# Patient Record
Sex: MALE | Race: BLACK OR AFRICAN AMERICAN | NOT HISPANIC OR LATINO | URBAN - METROPOLITAN AREA
[De-identification: names, ages, dates, MRNs, and addresses within clinical notes are randomized per-mention and may not be internally consistent; named-entity substitution may affect disease eponyms.]

---

## 2020-09-03 ENCOUNTER — EMERGENCY (EMERGENCY)
Facility: HOSPITAL | Age: 65
LOS: 1 days | Discharge: HOME | End: 2020-09-03
Attending: STUDENT IN AN ORGANIZED HEALTH CARE EDUCATION/TRAINING PROGRAM | Admitting: STUDENT IN AN ORGANIZED HEALTH CARE EDUCATION/TRAINING PROGRAM
Payer: MEDICAID

## 2020-09-03 VITALS
SYSTOLIC BLOOD PRESSURE: 138 MMHG | OXYGEN SATURATION: 100 % | DIASTOLIC BLOOD PRESSURE: 69 MMHG | TEMPERATURE: 98 F | HEART RATE: 70 BPM | RESPIRATION RATE: 16 BRPM

## 2020-09-03 VITALS
TEMPERATURE: 98 F | HEART RATE: 66 BPM | SYSTOLIC BLOOD PRESSURE: 114 MMHG | DIASTOLIC BLOOD PRESSURE: 64 MMHG | RESPIRATION RATE: 18 BRPM | OXYGEN SATURATION: 100 %

## 2020-09-03 PROCEDURE — 70450 CT HEAD/BRAIN W/O DYE: CPT | Mod: 26

## 2020-09-03 PROCEDURE — 72125 CT NECK SPINE W/O DYE: CPT | Mod: 26

## 2020-09-03 PROCEDURE — 99284 EMERGENCY DEPT VISIT MOD MDM: CPT

## 2020-09-03 RX ORDER — ACETAMINOPHEN 500 MG
975 TABLET ORAL ONCE
Refills: 0 | Status: COMPLETED | OUTPATIENT
Start: 2020-09-03 | End: 2020-09-03

## 2020-09-03 RX ADMIN — Medication 975 MILLIGRAM(S): at 16:14

## 2020-09-03 RX ADMIN — Medication 975 MILLIGRAM(S): at 15:28

## 2020-09-03 NOTE — ED PROVIDER NOTE - ATTENDING CONTRIBUTION TO CARE
65M sent from [facility] s/p fall. Pt states he was given a medicine for his back pain, then got dizzy and past out after standing up to go to lunch. Fell backwards, hit head on floor. Unknown down time but denies LOC. States that he had never gotten this medication before. +HA and neck pain. No longer feeling dizzy. Denies cp, sob, back pain, extremity pain, weakness, numbness/tingling. Full memory of event. No blood thinners  Gen: nad  HEENT: head without evidence of trauma, no ecchymosis appreciated, PERRL, EOMI  CV: rrr, no murmur  Pulm: clear lungs  Abd: soft, nt, n  MSK: atraumatic, FROM all extremities; lateral tenderness to neck without midline tenderness, no spinal tenderness  Neuro: CN 2-12 grossly intact, motor 5/5 all extremities, sensory grossly intact  MDM: 65M sent from [facility] s/p fall, likely med induced syncopal episode with fall backwayrds - will get CTH and neck to assess for injury, if negative can send back to [facility]

## 2020-09-03 NOTE — ED PROVIDER NOTE - CLINICAL SUMMARY MEDICAL DECISION MAKING FREE TEXT BOX
65M presenting for fall after Tizanidine. PE: VSS, small hematoma on scalp DdX: Likely medication side effect, low concern for subdural/epidural given mechanism, but given age will eval. Plan: CT head and neck.

## 2020-09-03 NOTE — ED PROVIDER NOTE - PATIENT PORTAL LINK FT
You can access the FollowMyHealth Patient Portal offered by [facility] by registering at the following website: [email or web address]. By joining [email or web address]’s FollowMyHealth portal, you will also be able to view your health information using other applications (apps) compatible with our system.

## 2020-09-03 NOTE — ED PROVIDER NOTE - PROGRESS NOTE DETAILS
Rusty, PGY2 – Pt was re-evaluated at bedside, feeling better overall, ambulating. CT non-actionable. Results were discussed with patient as well as return precautions and follow up plan with PCP and/or specialist. Time was taken to answer any questions that the patient had before providing them with discharge paperwork. SW arranged transport back to [facility].

## 2020-09-03 NOTE — ED PROVIDER NOTE - NSFOLLOWUPINSTRUCTIONS_ED_ALL_ED_FT
Please take Tylenol for your headaches.     Return to the ER immediately for new or worsening headaches vomiting, or any other concerning symptoms.     See attached information on concussion.     Take your home meds as prescribed, Please take Tylenol for your headaches.     Return to the ER immediately for new or worsening headaches vomiting, or any other concerning symptoms.     See attached information on concussion.     Take your home meds as prescribed.

## 2020-09-03 NOTE — ED PROVIDER NOTE - NS ED ROS FT
CONST: no fevers, no chills, no trauma  EYES: no pain, no blurry vision   ENT: no sore throat, no epistaxis, no rhinorrhea  CV: no chest pain, no palpitations, no orthopnea, no extremity pain or swelling  RESP: no shortness of breath, no cough, no sputum, no pleurisy, no wheezing  ABD: no abdominal pain, no nausea, no vomiting, no diarrhea, no black or bloody stool  : no dysuria, no hematuria, no frequency, no urgency  MSK: no back pain, no neck pain, no extremity pain  NEURO: + headache, no sensory disturbances, no focal weakness, no dizziness  HEME: no easy bleeding or bruising  SKIN: no diaphoresis, no rash

## 2020-09-03 NOTE — ED ADULT NURSE NOTE - OBJECTIVE STATEMENT
break coverage RN - pt received in spot 27, A&OX3. Pt from [facility]. Pt states he was given a medication for back pain, shortly after felt dizzy and fell. States he hit the back of his head on a rail and + abrasion to back of head. Denies LOC. Unwitnessed fall. Pt c/o head pain at this time. Denies continued dizziness, CP. Resp even and unlabored. EKG completed. Medicated as per EMAR. Will continue to monitor.

## 2020-09-03 NOTE — ED ADULT NURSE NOTE - CHIEF COMPLAINT QUOTE
Patient brought to ER by EMS from [place] for c/o dizziness. Pt was given Zenaflex for back pain, became dizzy and fell. He hit the back of his head on bed rail and has a bump to back of head. No LOC. The fall was not witnessed, staff heard him fall and came to assistance.

## 2020-09-03 NOTE — ED PROVIDER NOTE - OBJECTIVE STATEMENT
65M no significant pmhx presenting with CC of fall at [facility]. States that he was having back pain, given Tizanidine, got dizzy and fell backwards, hit the back of her head. Denies LOC, states that he had never gotten this medication before. +HA at this time. No memory loss, lightheadedness, nausea/vomiting at this time. No cardiac hx, no cp.

## 2020-09-03 NOTE — PROVIDER CONTACT NOTE (OTHER) - ASSESSMENT
Pt is from [place] [id number] [phone], spoke with Ying  and confirmed pt is a resident there. I arranged [employer] [phone]. [facility] Auth# . P/U 10 PM. Verbal Huddle completed.

## 2020-09-03 NOTE — ED PROVIDER NOTE - PHYSICAL EXAMINATION
Const: Well-nourished, Well-developed, appearing stated age.  Eyes: no conjunctival injection, and symmetrical lids.  HEENT: +superficial hematoma (3cm?) on posterior right scalp. Atraumatic external nose and ears. Moist MM.  Neck: Symmetric, trachea midline.   CVS: +S1/S2, Peripheral pulses 2+ and equal in all extremities.  RESP: Unlabored respiratory effort. Clear to auscultation bilaterally.  GI: Nontender/Nondistended, No CVA tenderness b/l.   MSK: Normocephalic/Atraumatic, Lower Extremities w/o calf tenderness or edema b/l.   Skin: Warm, dry and intact.   Neuro: CNs II-XII grossly intact. Motor & Sensation grossly intact.  Psych: Awake, Alert, & Oriented (AAO) x3. Appropriate mood and affect.

## 2020-09-03 NOTE — ED ADULT NURSE REASSESSMENT NOTE - NS ED NURSE REASSESS COMMENT FT1
Spoke to ANNA Winston. Pt to take taxi back to [facility]. Spoke to ANNA Winston. Pt to take taxi back to [facility]. Paperwork given.

## 2020-09-03 NOTE — ED ADULT TRIAGE NOTE - CHIEF COMPLAINT QUOTE
Patient brought to ER by EMS from [place] for c/o dizziness. Pt was given Zenaflex for back pain, became dizzy and fell. He hit the back of his head on bed rail and has a bump to back of head. No LOC. The fall was not witnessed, staff heard him fall and came to assistance.

## 2020-09-27 ENCOUNTER — EMERGENCY (EMERGENCY)
Facility: HOSPITAL | Age: 65
LOS: 1 days | Discharge: HOME | End: 2020-09-27
Attending: STUDENT IN AN ORGANIZED HEALTH CARE EDUCATION/TRAINING PROGRAM | Admitting: STUDENT IN AN ORGANIZED HEALTH CARE EDUCATION/TRAINING PROGRAM
Payer: MEDICAID

## 2020-09-27 VITALS
TEMPERATURE: 98 F | OXYGEN SATURATION: 98 % | RESPIRATION RATE: 20 BRPM | DIASTOLIC BLOOD PRESSURE: 76 MMHG | HEART RATE: 73 BPM | SYSTOLIC BLOOD PRESSURE: 133 MMHG

## 2020-09-27 VITALS
RESPIRATION RATE: 20 BRPM | TEMPERATURE: 98 F | DIASTOLIC BLOOD PRESSURE: 50 MMHG | OXYGEN SATURATION: 100 % | HEART RATE: 59 BPM | SYSTOLIC BLOOD PRESSURE: 108 MMHG

## 2020-09-27 LAB — SARS-COV-2 RNA SPEC QL NAA+PROBE: SIGNIFICANT CHANGE UP

## 2020-09-27 PROCEDURE — 99283 EMERGENCY DEPT VISIT LOW MDM: CPT

## 2020-09-27 RX ORDER — BENZOCAINE AND MENTHOL 5; 1 G/100ML; G/100ML
1 LIQUID ORAL ONCE
Refills: 0 | Status: COMPLETED | OUTPATIENT
Start: 2020-09-27 | End: 2020-09-27

## 2020-09-27 RX ADMIN — BENZOCAINE AND MENTHOL 1 LOZENGE: 5; 1 LIQUID ORAL at 08:45

## 2020-09-27 NOTE — PROVIDER CONTACT NOTE (OTHER) - ASSESSMENT
ANNA following HIE Pt Alert, ANNA contacted by clinical provider Cheng who states Pt is from [facility] [address], [phone], [address] [place] [address]. ANNA spoke with residence staff KIMBRELY Lee who confirmed Pt is a resident, confirmed address. RN informs Pt must have Covid negative result before returning to the residence.  ANNA informed Clinical Provider.  ANNA to remain available. ANNA following HIE Pt Alert, ANNA contacted by clinical provider Cheng Carey who states Pt is from [facility] [address], [phone], [address] [place] [address]. ANNA spoke with residence staff KIMBERLY Lee who confirmed Pt is a resident, confirmed address. RN informs Pt must have Covid negative result before returning to the residence.  ANNA informed Clinical Provider.  SW to remain available.

## 2020-09-27 NOTE — ED PROVIDER NOTE - OBJECTIVE STATEMENT
65 M hx of opioid use disorder, presenting from [place] where he is right now for detox from heroin. He is here today b/c of sore throat and fever to100.4 at the facility per pt. Has been having 3 days of symptoms. He would not have come to the ER today if not for the staff calling EMS. He feels like he just has a mild cold, has been trying to avoid going outside to avoid catching COVID. Denies cough, SoB, CP, body aches/chills. 65 M hx of opioid use disorder, depression, presenting from [place] where he is right now for detox from heroin. He is here today b/c of sore throat and fever to100.4 at the facility per pt. Has been having 3 days of symptoms. He would not have come to the ER today if not for the staff calling EMS. He feels like he just has a mild cold, states they are not allowed to go outside to avoid catching COVID. Denies cough, SoB, CP, body aches/chills.

## 2020-09-27 NOTE — ED PROVIDER NOTE - NS ED ROS FT
CONSTITUTIONAL: No fevers, no chills, no lightheadedness, no dizziness  Eyes: no visual changes  Ears: no ear drainage, no ear pain  Nose: +rhinorrhea  Mouth/Throat: +sore throat  CV: No chest pain, no palpitations  PULM: No SOB, no cough  GI: No n/v/d, no abd pain  : no dysuria, no hematuria  SKIN: no rashes.  NEURO: no headache, no focal weakness or numbness  LYMPH/VASC: no LE swelling

## 2020-09-27 NOTE — ED PROVIDER NOTE - PROGRESS NOTE DETAILS
COVID swab negative, patient able to return to facility. Will coordinate return with ANNA Carey, DO PGY-2

## 2020-09-27 NOTE — ED ADULT NURSE NOTE - CHPI ED NUR SYMPTOMS POS
FEVER Ivermectin Counseling:  Patient instructed to take medication on an empty stomach with a full glass of water.  Patient informed of potential adverse effects including but not limited to nausea, diarrhea, dizziness, itching, and swelling of the extremities or lymph nodes.  The patient verbalized understanding of the proper use and possible adverse effects of ivermectin.  All of the patient's questions and concerns were addressed.

## 2020-09-27 NOTE — ED PROVIDER NOTE - PATIENT PORTAL LINK FT
You can access the FollowMyHealth Patient Portal offered by [facility] by registering at the following website: [email or web address]. By joining [employer]’s FollowMyHealth portal, you will also be able to view your health information using other applications (apps) compatible with our system.

## 2020-09-27 NOTE — ED PROVIDER NOTE - PHYSICAL EXAMINATION
gen: well appearing  Mentation: AAO x 3  psych: mood appropriate  ENT: airway patent, no exudates  Eyes: conjunctivae clear bilaterally  Cardio: RRR, no m/r/g  Resp: normal BS b/l  GI: s/nt/nd  : no CVA tenderness  Neuro: sensation and motor function intact  Skin: No evidence of rash  MSK: normal movement of all extremities  Lymph/Vasc: no LE edema

## 2020-09-27 NOTE — ED PROVIDER NOTE - CLINICAL SUMMARY MEDICAL DECISION MAKING FREE TEXT BOX
66 y/o M sent from [place] for r/o COVID, symptoms consistent with viral URI with no known COVID contacts. Symptomatic treatment, COVID swab. Dispo pending results, can return if negative - Cheng Carey, DO PGY-2

## 2020-09-27 NOTE — ED ADULT NURSE NOTE - CHIEF COMPLAINT QUOTE
Patient is from detox from [facility]. Patient had a sore throat and runny nose yesturday started to have a fever this AM. Patient took tylenol around 726AM

## 2020-09-27 NOTE — ED ADULT NURSE NOTE - OBJECTIVE STATEMENT
65 year old male, AOX3, arrived from [facility], reports being in detox program and has not drank alcohol or use drugs x 45 days, reports having the "flu" with fevers and sore throat.

## 2020-09-27 NOTE — ED PROVIDER NOTE - ATTENDING CONTRIBUTION TO CARE
I performed a history and physical exam of the patient and discussed their management with the resident.  I reviewed the resident's note and agree with the documented findings and plan of care except as noted below. My medical decision making and observations are as follows:\    65 M hx of opioid use disorder, depression, presenting from detox at [facility] for fever.  Pt reports a few days of sore throat and runny nose, staff today noticed he had a fever of 100.4 and sent him to er to r/o covid.  He would not have come to the ER today if not for the staff calling EMS.  Pt is well appearing, NAD, A&O x 3, PERRL, no LAD, oropharynx clear, heart rrr, lungs cta.  Likely viral URI, possibly covid - will check covid swab, give pain control and antipyretics as necessary and reassess.

## 2020-12-30 NOTE — ED ADULT NURSE NOTE - NSIMPLEMENTINTERV_GEN_ALL_ED
Implemented All Universal Safety Interventions:  [place] to call system. Call bell, personal items and telephone within reach. Instruct patient to call for assistance. Room bathroom lighting operational. Non-slip footwear when patient is off stretcher. Physically safe environment: no spills, clutter or unnecessary equipment. Stretcher in lowest position, wheels locked, appropriate side rails in place.
Principal Discharge DX:	Neck strain, initial encounter

## 2023-03-10 NOTE — ED ADULT TRIAGE NOTE - CCCP TRG CHIEF CMPLNT
Thank you for visiting me today at the [facility].   Please follow up with me in 3 months.    fever, sore throat

## 2023-11-11 NOTE — ED ADULT TRIAGE NOTE - CHIEF COMPLAINT QUOTE
Patient is chronically on statin.will continue for now. Last Lipid Panel:   Lab Results   Component Value Date    CHOL 230 (H) 05/26/2023    HDL 51 05/26/2023    LDLCALC 156.2 05/26/2023    TRIG 114 05/26/2023    CHOLHDL 22.2 05/26/2023      Patient is from detox from [facility]. Patient had a sore throat and runny nose yesturday started to have a fever this AM. Patient took tylenol around 726AM